# Patient Record
Sex: MALE | Race: WHITE | NOT HISPANIC OR LATINO | Employment: UNEMPLOYED | URBAN - METROPOLITAN AREA
[De-identification: names, ages, dates, MRNs, and addresses within clinical notes are randomized per-mention and may not be internally consistent; named-entity substitution may affect disease eponyms.]

---

## 2020-05-02 ENCOUNTER — HOSPITAL ENCOUNTER (EMERGENCY)
Facility: HOSPITAL | Age: 45
Discharge: HOME | End: 2020-05-02
Attending: EMERGENCY MEDICINE | Admitting: EMERGENCY MEDICINE

## 2020-05-02 VITALS
DIASTOLIC BLOOD PRESSURE: 99 MMHG | HEIGHT: 65 IN | OXYGEN SATURATION: 98 % | RESPIRATION RATE: 20 BRPM | WEIGHT: 140 LBS | SYSTOLIC BLOOD PRESSURE: 129 MMHG | TEMPERATURE: 98.1 F | BODY MASS INDEX: 23.32 KG/M2 | HEART RATE: 143 BPM

## 2020-05-02 DIAGNOSIS — G62.9 PERIPHERAL POLYNEUROPATHY: ICD-10-CM

## 2020-05-02 DIAGNOSIS — G89.29 CHRONIC MIDLINE LOW BACK PAIN WITHOUT SCIATICA: Primary | ICD-10-CM

## 2020-05-02 DIAGNOSIS — M54.50 CHRONIC MIDLINE LOW BACK PAIN WITHOUT SCIATICA: Primary | ICD-10-CM

## 2020-05-02 PROCEDURE — 99283 EMERGENCY DEPT VISIT LOW MDM: CPT

## 2020-05-02 PROCEDURE — 63710000001 DEXAMETHASONE PER 0.25 MG: Performed by: EMERGENCY MEDICINE

## 2020-05-02 RX ORDER — METHYLPREDNISOLONE 4 MG/1
TABLET ORAL
Qty: 1 EACH | Refills: 0 | Status: SHIPPED | OUTPATIENT
Start: 2020-05-02

## 2020-05-02 RX ORDER — NAPROXEN 500 MG/1
500 TABLET ORAL 2 TIMES DAILY WITH MEALS
Qty: 20 TABLET | Refills: 0 | Status: SHIPPED | OUTPATIENT
Start: 2020-05-02

## 2020-05-02 RX ORDER — NAPROXEN 250 MG/1
500 TABLET ORAL ONCE
Status: COMPLETED | OUTPATIENT
Start: 2020-05-02 | End: 2020-05-02

## 2020-05-02 RX ADMIN — NAPROXEN 500 MG: 250 TABLET ORAL at 04:54

## 2020-05-02 RX ADMIN — DEXAMETHASONE 10 MG: 4 TABLET ORAL at 04:55

## 2020-05-02 NOTE — DISCHARGE INSTRUCTIONS
Follow up with one of the [facility] Primary Care Providers below to setup primary care. If you need assistance coordinating a primary care appointment with a [facility] Primary Care Provider, please contact the Primary Care Coordinators at [phone] for appointment scheduling.    [facility], Primary Care   [address] , [address]   [place], [place] [address] [phone]    [facility] Internal Medicine & Endocrinology  [address], [address]  [place] [address] [phone]    [facility]  [address], [address]   [place] [address] [phone]    [facility]  [address], [address]  [place], [place] [address] [phone]    [facility], Primary Care,   [address], [address]   [place], [place] [address] [phone]    [facility]  [address]  [place], [place] [address] [phone]    [facility] Primary Care  [address], [address]  [place], [place] [id number] [phone]    [facility] Internal Medicine & Pediatrics  [address], [address]   [place] [address] [phone]    [facility], Primary Care  [address] [place] [address] [phone]      [facility]  [address], [address]   [place] [address] [phone]    [facility]  [address] [place] [address] [phone]

## 2020-05-02 NOTE — ED PROVIDER NOTES
EMERGENCY DEPARTMENT ENCOUNTER      Pt Name: Eliot Vega  MRN: [id number]  YOB: 1975  Date of evaluation: 5/2/2020  Provider: Donny Srivastava DO    CHIEF COMPLAINT       Chief Complaint   Patient presents with   • Back Pain         HISTORY OF PRESENT ILLNESS  (Location/Symptom, Timing/Onset, Context/Setting, Quality, Duration, Modifying Factors, Severity.)   Eliot Vega is a 45 y.o. male who presents to the emergency department for evaluation of chronic lower back pain, with some numbness and tingling to bilateral feet which he notes is been a chronic issue for him but waxes and wanes in severity.  The patient denies any falls, injury or trauma, no saddle anesthesia or loss of bowel or bladder function.  No prior back surgeries.  He states he is currently homeless, does walk many miles a day, does have a heavy backpack for which she carries everywhere.  He denies any fevers or chills, chest pain or difficulty breathing.  The patient states he would like some symptomatic control but has no other acute systemic complaints at this time.      Nursing notes were reviewed.    REVIEW OF SYSTEMS    (2-9 systems for level 4, 10 or more for level 5)   ROS:  General:  No fevers, no chills, no weakness  Cardiovascular:  No chest pain, no palpitations  Respiratory:  No shortness of breath, no cough, no wheezing  Gastrointestinal:  No pain, no nausea, no vomiting, no diarrhea  Musculoskeletal:  No muscle pain, no joint pain  Skin:  No rash, no easy bruising  Neurologic:  No speech problems, no headache  Psychiatric:  No anxiety  Genitourinary:  No dysuria, no hematuria    Except as noted above the remainder of the review of systems was reviewed and negative.       PAST MEDICAL HISTORY   No past medical history on file.      SURGICAL HISTORY     No past surgical history on file.      CURRENT MEDICATIONS     No current facility-administered medications for this encounter.     Current Outpatient Medications:  "  •  methylPREDNISolone (MEDROL, JOCE,) 4 MG tablet, Take as directed on package instructions., Disp: 1 each, Rfl: 0  •  naproxen (Naprosyn) 500 MG tablet, Take 1 tablet by mouth 2 (Two) Times a Day With Meals., Disp: 20 tablet, Rfl: 0    ALLERGIES     Patient has no known allergies.    FAMILY HISTORY     No family history on file.       SOCIAL HISTORY       Social History     Socioeconomic History   • Marital status: Single     Spouse name: Not on file   • Number of children: Not on file   • Years of education: Not on file   • Highest education level: Not on file         PHYSICAL EXAM    (up to 7 for level 4, 8 or more for level 5)     Vitals:    05/02/20 0252   BP: 129/99   Pulse: (!) 143   Resp: 20   Temp: 98.1 °F (36.7 °C)   TempSrc: Oral   SpO2: 98%   Weight: 63.5 kg (140 lb)   Height: 165.1 cm (65\")       Physical Exam  General :Patient is awake, alert, oriented, in no acute distress, nontoxic appearing, walking around the department in his room without issue, asking to use the phone multiple times.  HEENT: Pupils are equally round and reactive to light, EOMI, conjunctivae clear, sclerae white, there is no injection no icterus.  Oral mucosa is moist, no exudate. Uvula is midline  Neck: Neck is supple, full range of motion, trachea midline  Cardiac: Heart tachycardic rate, regular rhythm  Lungs: Lungs with no acute respiratory distress, no audible wheezing or rhonchi, no accessory muscle usage.  Chest wall: There is no tenderness to palpation over the chest wall or over ribs  Abdomen: Abdomen is soft, nontender, nondistended. There is no firm or pulsatile masses, no rebound rigidity or guarding.   Musculoskeletal: Along the lumbar region there is an area of tenderness, some mild soft tissue swelling on the lower lumbar area which the patient notes he has had for many years, there is no overlying erythema, no active bleeding or drainage from the site.  No contusions or abrasions are appreciated.  Patient is " "ambulatory around the room without issue.  No trouble with gait.  5 out of 5 strength in all 4 extremities.  No focal muscle deficits are appreciated  Neuro: Motor intact, sensory intact, level of consciousness is normal, cerebellar function is normal, reflexes are grossly normal. No evidence of incontinence or loss of bowel or bladder function, no saddle anesthesia noted   Dermatology: Skin is warm and dry  Psych: Mentation is grossly normal, cognition is grossly normal. Affect is appropriate.      DIAGNOSTIC RESULTS     EKG: All EKG's are interpreted by the Emergency Department Physician who either signs or Co-signs this chart in the absence of a cardiologist.    No orders to display       RADIOLOGY:   Non-plain film images such as CT, Ultrasound and MRI are read by the radiologist. Plain radiographic images are visualized and preliminarily interpreted by the emergency physician with the below findings:      [] Radiologist's Report Reviewed:  No orders to display         ED BEDSIDE ULTRASOUND:   Performed by ED Physician - none    LABS:    I have reviewed and interpreted all of the currently available lab results from this visit (if applicable):  No results found for this or any previous visit.     All other labs were within normal range or not returned as of this dictation.      EMERGENCY DEPARTMENT COURSE and DIFFERENTIAL DIAGNOSIS/MDM:   Vitals:    Vitals:    05/02/20 0252   BP: 129/99   Pulse: (!) 143   Resp: 20   Temp: 98.1 °F (36.7 °C)   TempSrc: Oral   SpO2: 98%   Weight: 63.5 kg (140 lb)   Height: 165.1 cm (65\")            Patient with chronic lower back pain with some neuropathy to bilateral feet.  Currently patient is homeless, does walk many miles per day with a heavy backpack on.  I feel he this is exacerbating his chronic back pain.  He does have some mild swelling around the lumbar region which she states is been there for many years, could have some underlying birth defect as he states it has been " present for a while.  There is no signs of active infection, bleeding or drainage from the sites.  Discussed with patient being given medications for anti-inflammatory, pain control which we did give him a dose of Decadron, naproxen in the ED, prescription for the same I would plans to follow-up with a PCP for reevaluation.  Patient is agreeable to this.  I do not see any signs of acute discitis or cauda equina syndrome.  I had a discussion with the patient/family regarding diagnosis, diagnostic results, treatment plan, and medications.  The patient/family indicated understanding of these instructions.  I spent adequate time at the bedside proceeding discharge necessary to personally discuss the aftercare instructions, giving patient education, providing explanations of the results of our evaluations/findings, and my decision making to assure that the patient/family understand the plan of care.  Time was allotted to answer questions at that time and throughout the ED course.  Emphasis was placed on timely follow-up after discharge.  I also discussed the potential for the development of an acute emergent condition requiring further evaluation, admission, or even surgical intervention. I discussed that we found nothing during the visit today indicating the need for further workup, admission, or the presence of an unstable medical condition.  I encouraged the patient to return to the emergency department immediately for ANY concerns, worsening, new complaints, or if symptoms persist and unable to seek follow-up in a timely fashion.  The patient/family expressed understanding and agreement with this plan.  The patient will follow-up with their PCP in 1-2 days for reevaluation.     I estimate there is LOW risk for (including but not limited to) RAPIDLY EXPANDING OR RUPTURED AAA, AORTIC DISSECTION, CAUDA EQUINA SYNDROME, or EPIDURAL MASS LESION thus I consider the discharge disposition reasonable. Eliot Vega (or their  surrogate) and I have discussed the diagnosis and risks, and we agree with discharging home with close follow-up. We also discussed returning to the Emergency Department immediately if new or worsening symptoms occur. We have discussed the symptoms which are most concerning that necessitate immediate return.        MEDICATIONS ADMINISTERED IN ED:  Medications   dexamethasone (DECADRON) tablet 10 mg (10 mg Oral Given 5/2/20 3739)   naproxen (NAPROSYN) tablet 500 mg (500 mg Oral Given 5/2/20 0470)       PROCEDURES:  Procedures    CRITICAL CARE TIME    Total Critical Care time was 0 minutes, excluding separately reportable procedures.   There was a high probability of clinically significant/life threatening deterioration in the patient's condition which required my urgent intervention.      FINAL IMPRESSION      1. Chronic midline low back pain without sciatica    2. Peripheral polyneuropathy          DISPOSITION/PLAN     ED Disposition     ED Disposition Condition Comment    Discharge Stable           PATIENT REFERRED TO:  PCP on the list    In 2 days      [facility] Emergency Department  [address] [place] [id number] [phone]    If symptoms worsen      DISCHARGE MEDICATIONS:     Medication List      START taking these medications    methylPREDNISolone 4 MG tablet  Commonly known as:  MEDROL (JOCE)  Take as directed on package instructions.     naproxen 500 MG tablet  Commonly known as:  Naprosyn  Take 1 tablet by mouth 2 (Two) Times a Day With Meals.              Comment: Please note this report has been produced using speech recognition software.      Donny Srivastava DO  Attending Emergency Physician               Donny Srivastava DO  05/02/20 0538

## 2021-08-11 PROCEDURE — 99281 EMR DPT VST MAYX REQ PHY/QHP: CPT

## 2021-08-12 ENCOUNTER — HOSPITAL ENCOUNTER (EMERGENCY)
Facility: HOSPITAL | Age: 46
Discharge: HOME | End: 2021-08-12
Attending: EMERGENCY MEDICINE | Admitting: EMERGENCY MEDICINE

## 2021-08-12 VITALS
HEART RATE: 90 BPM | WEIGHT: 130 LBS | TEMPERATURE: 96.6 F | RESPIRATION RATE: 18 BRPM | OXYGEN SATURATION: 99 % | SYSTOLIC BLOOD PRESSURE: 110 MMHG | HEIGHT: 65 IN | DIASTOLIC BLOOD PRESSURE: 90 MMHG | BODY MASS INDEX: 21.66 KG/M2

## 2021-08-12 DIAGNOSIS — Z59.00 HOMELESS: Primary | ICD-10-CM

## 2021-08-12 DIAGNOSIS — F43.9 STRESS: ICD-10-CM

## 2021-08-12 SDOH — ECONOMIC STABILITY - HOUSING INSECURITY: HOMELESSNESS UNSPECIFIED: Z59.00

## 2021-08-12 NOTE — ED PROVIDER NOTES
"Subjective   46-year-old male presents for evaluation of \"feeling stressed.\"  Of note, the patient is homeless and has been for quite some time.  He states that he has felt stressed now for the last 4 years.  He states that he has no family support here in [place] and is originally from [place], [place].  He states that he likes it here though.  He presents tonight simply requesting a place to get some rest.  He is also requesting something to eat and drink.  He denies any drug or alcohol use.  He is not suicidal.  He is not homicidal.  He is quite reasonable at this time and states that all he is asking for is a place to rest and something to eat and drink.  He has no other complaints at this time.          Review of Systems   Psychiatric/Behavioral: Negative for suicidal ideas.   All other systems reviewed and are negative.      No past medical history on file.    No Known Allergies    No past surgical history on file.    No family history on file.    Social History     Socioeconomic History   • Marital status: Single     Spouse name: Not on file   • Number of children: Not on file   • Years of education: Not on file   • Highest education level: Not on file           Objective   Physical Exam  Vitals and nursing note reviewed.   Constitutional:       General: He is not in acute distress.     Appearance: Normal appearance. He is well-developed. He is not diaphoretic.      Comments: Nontoxic-appearing male, disheveled   HENT:      Head: Normocephalic and atraumatic.   Neck:      Vascular: No JVD.   Cardiovascular:      Rate and Rhythm: Normal rate and regular rhythm.      Heart sounds: Normal heart sounds. No murmur heard.   No friction rub. No gallop.    Pulmonary:      Effort: Pulmonary effort is normal. No respiratory distress.      Breath sounds: Normal breath sounds. No wheezing or rales.   Abdominal:      General: Bowel sounds are normal. There is no distension.      Palpations: Abdomen is soft. " "There is no mass.      Tenderness: There is no abdominal tenderness. There is no guarding.   Musculoskeletal:         General: Normal range of motion.      Cervical back: Normal range of motion.   Skin:     General: Skin is warm and dry.      Coloration: Skin is not pale.      Findings: No erythema or rash.   Neurological:      Mental Status: He is alert and oriented to person, place, and time.      Comments: Awake, alert, and oriented x3, clear and fluent speech, no ataxia or dysmetria, normal gait, neurovascularly intact distally in all fours with bounding distal pulses and normal sensation, 5 out of 5 strength in all fours, no cranial nerve deficits noted with cranial nerves II through XII grossly intact   Psychiatric:         Mood and Affect: Mood normal.         Thought Content: Thought content normal.         Judgment: Judgment normal.      Comments: Denies any suicidal or homicidal ideation         Procedures           ED Course  ED Course as of Aug 12 0510   Thu Aug 12, 2021   0508 46-year-old male presents for evaluation of \"stress\" and is simply requesting a place to rest and food and drink.  Unfortunately, due to the long wait times at our facility tonight, the patient waited in the waiting room for more than 9 hours before getting back to bed this morning.  He was quite pleasant and tells me that all he needed is a place to rest and something to eat and drink.  Given the fact that he waited nearly 10 hours, I feel that this is a very reasonable request.  He adamantly denies any suicidal or homicidal ideation.  No IV drug use.  He is not an alcohol drinker.  I offered to touch base with  case management this morning, but he declined and states that that will not be necessary.  I will discharge him with outpatient resources to help him with his current situation.  Agreeable with plan and given appropriate strict return precautions.  No further work-up needed emergently at this time.    [DD]    " "  ED Course User Index  [DD] Seth Knight MD                                 No results found for this or any previous visit (from the past 24 hour(s)).  Note: In addition to lab results from this visit, the labs listed above may include labs taken at another facility or during a different encounter within the last 24 hours. Please correlate lab times with ED admission and discharge times for further clarification of the services performed during this visit.    No orders to display     Vitals:    08/11/21 1919   BP: (!) 119/102   BP Location: Left arm   Patient Position: Sitting   Pulse: 115   Resp: 20   Temp: 96.6 °F (35.9 °C)   TempSrc: Oral   SpO2: 92%   Weight: 59 kg (130 lb)   Height: 165.1 cm (65\")     Medications - No data to display  ECG/EMG Results (last 24 hours)     ** No results found for the last 24 hours. **        No orders to display                 MDM    Final diagnoses:   Homeless   Stress       ED Disposition  ED Disposition     ED Disposition Condition Comment    Discharge Stable           No follow-up provider specified.       Medication List      No changes were made to your prescriptions during this visit.          Seth Knight MD  08/12/21 0525    "

## 2021-08-12 NOTE — DISCHARGE INSTRUCTIONS
[facility]  [address] [place] [address]  [phone]      [facility]    [address]  [address]  [place] [address]  [phone] [address]  [address]  [place] [address]  [phone]      [facility] ([place] [employer], [employer] HMO, [employer] PPO, [place] EPO, [place] Student Ins, Medicare and Medicaid)    [address]  [address]rd [place] [address]  [phone]       [facility] (4 locations) Accepts most insurance and Medicaid ([place] location only) No Medicare, [employer] or [place] [employer]    [facility]  [address] [place] [address]  [phone]       [facility]  [address]  [address]  [place] [address]  [phone]      [facility]  [address] [place] [address]  [phone]                     [facility]  [address]   [place], [place] [address] [phone]       [facility] (Preferred Medicaid Provider and some Major Insurance Plans) [phone]    [facility]  [address] [place] [address]       [facility]  [address] [place] [address]       [place]  [address] [place] [address]       [facility] (Most insurance and Medicaid)  [address]  [address] [place] [address]  [phone]      [facility]    [address]  [place] [address]  [phone]      [address]  [address]  [place] [address]       [facility]  [address] [place] [address] [phone]       [facility] 24-Hour Help Line [phone]      [facility]  [address] [address]  [phone]             [facility]  [address] [address]  [phone]             [facility]  [address] [address]  [phone]                [facility]  [address]  [place], [place] [address] [phone]       [facility]  [address] [place], [place] [address] [phone]      [facility]  [address] [place] [address]  [phone]              [facility]  [address]  [place], [place] [address] [phone]                  [facility]  [address] [place] [address] [phone]      [facility]  [address] [place] [address] [phone]                  [facility]  [address] [place] [address] [phone]      [facility]  [address] [place] [address] [phone]       [facility]  [address] [place] [address] [phone]      [facility]  [address] [place] [address] [phone]       [facility]  [address] [place] [address] [phone]

## 2021-08-15 ENCOUNTER — HOSPITAL ENCOUNTER (EMERGENCY)
Facility: HOSPITAL | Age: 46
Discharge: LEFT BEFORE DISCHARGE | End: 2021-08-15

## 2021-08-15 PROCEDURE — 99211 OFF/OP EST MAY X REQ PHY/QHP: CPT

## 2021-08-29 ENCOUNTER — APPOINTMENT (OUTPATIENT)
Dept: GENERAL RADIOLOGY | Facility: HOSPITAL | Age: 46
End: 2021-08-29

## 2021-08-29 ENCOUNTER — HOSPITAL ENCOUNTER (EMERGENCY)
Facility: HOSPITAL | Age: 46
Discharge: LEFT BEFORE DISCHARGE | End: 2021-08-30

## 2021-08-29 VITALS
TEMPERATURE: 98.2 F | SYSTOLIC BLOOD PRESSURE: 140 MMHG | HEIGHT: 64 IN | BODY MASS INDEX: 22.2 KG/M2 | OXYGEN SATURATION: 100 % | RESPIRATION RATE: 16 BRPM | WEIGHT: 130 LBS | HEART RATE: 105 BPM | DIASTOLIC BLOOD PRESSURE: 105 MMHG

## 2021-08-29 PROCEDURE — 99211 OFF/OP EST MAY X REQ PHY/QHP: CPT
